# Patient Record
Sex: FEMALE | Race: WHITE | NOT HISPANIC OR LATINO | Employment: FULL TIME | URBAN - METROPOLITAN AREA
[De-identification: names, ages, dates, MRNs, and addresses within clinical notes are randomized per-mention and may not be internally consistent; named-entity substitution may affect disease eponyms.]

---

## 2017-01-05 ENCOUNTER — HOSPITAL ENCOUNTER (OUTPATIENT)
Dept: RADIOLOGY | Facility: HOSPITAL | Age: 27
Discharge: HOME | End: 2017-01-05
Attending: ORTHOPAEDIC SURGERY
Payer: OTHER GOVERNMENT

## 2017-01-05 ENCOUNTER — OFFICE VISIT (OUTPATIENT)
Dept: ORTHOPEDICS | Facility: CLINIC | Age: 27
End: 2017-01-05
Payer: OTHER GOVERNMENT

## 2017-01-05 VITALS
SYSTOLIC BLOOD PRESSURE: 103 MMHG | HEART RATE: 57 BPM | HEIGHT: 59 IN | DIASTOLIC BLOOD PRESSURE: 66 MMHG | BODY MASS INDEX: 27.4 KG/M2 | WEIGHT: 135.94 LBS

## 2017-01-05 DIAGNOSIS — M76.72 PERONEAL TENDINITIS, LEFT: Primary | ICD-10-CM

## 2017-01-05 DIAGNOSIS — M25.572 CHRONIC PAIN OF LEFT ANKLE: ICD-10-CM

## 2017-01-05 DIAGNOSIS — G89.29 CHRONIC PAIN OF LEFT ANKLE: ICD-10-CM

## 2017-01-05 PROCEDURE — 73610 X-RAY EXAM OF ANKLE: CPT | Mod: TC,LT

## 2017-01-05 PROCEDURE — 99213 OFFICE O/P EST LOW 20 MIN: CPT | Mod: PBBFAC | Performed by: PHYSICIAN ASSISTANT

## 2017-01-05 PROCEDURE — 73610 X-RAY EXAM OF ANKLE: CPT | Mod: 26,LT,, | Performed by: RADIOLOGY

## 2017-01-05 PROCEDURE — 99203 OFFICE O/P NEW LOW 30 MIN: CPT | Mod: S$PBB,,, | Performed by: PHYSICIAN ASSISTANT

## 2017-01-05 PROCEDURE — 99999 PR PBB SHADOW E&M-EST. PATIENT-LVL III: CPT | Mod: PBBFAC,,, | Performed by: PHYSICIAN ASSISTANT

## 2017-01-05 NOTE — MR AVS SNAPSHOT
[facility]  [address]  [facility] [address]  Phone: [phone]                  Leeann Hess   2017 9:00 AM   Appointment    Description:  Female : 1990   Provider:  Conchita Borrego PA-C   Department:  [facility] - Orthopedics                To Do List           Future Appointments        Provider Department Dept Phone    2017 9:00 AM Conchita Borrego PA-C [facility] [phone]      Goals (5 Years of Data)     None      [facility]     [facility] On Call Nurse Care Line -  Assistance  Registered nurses in the [facility] On Call Center provide clinical advisement, health education, appointment booking, and other advisory services.  Call for this free service at [phone].             Medications           Message regarding Medications     Verify the changes and/or additions to your medication regime listed below are the same as discussed with your clinician today.  If any of these changes or additions are incorrect, please notify your healthcare provider.             Verify that the below list of medications is an accurate representation of the medications you are currently taking.  If none reported, the list may be blank. If incorrect, please contact your healthcare provider. Carry this list with you in case of emergency.           Current Medications     sumatriptan (IMITREX) 100 MG tablet Take 1 tablet (100 mg total) by mouth every 2 (two) hours as needed for Migraine.           Clinical Reference Information           Allergies as of 2017     No Known Allergies      Immunizations Administered on Date of Encounter - 2017     None      [facility] Sign-Up     Activating your [facility] account is as easy as 1-2-3!     1) Visit [email or web address], select Sign Up Now, enter this activation code and your date of birth, then select Next.  -[email or web address]  Expires: 2017  7:48 AM      2) Create a username and password to use when you visit  [facility] in the future and select a security question in case you lose your password and select Next.    3) Enter your e-mail address and click Sign Up!    Additional Information  If you have questions, please e-mail [email or web address] or call [phone] to talk to our [facility] staff. Remember, [facility] is NOT to be used for urgent needs. For medical emergencies, dial 911.

## 2017-01-10 NOTE — PROGRESS NOTES
Subjective:      Patient ID: Leeann Hess is a 26 y.o. female.    Chief Complaint: No chief complaint on file.    HPI  26 year old female presents with chief complaint of left ankle pain since 11/5/16. [place] in the [employer] [place] 23 mile hike. She rolled it and fell on mile 4. She couldn't WB initially, but she was able to finish the hike. [place] were negative for fx. She was given crutches and did PT for 10 days. She says her ankle has gotten a lot better, but she still has some pain. It is at the lateral aspect. Her gait has changed. She walks and runs with her foot turned out. Sometimes her ankle gives way with stairs or if she steps wrong. It is painful when it gives way. She wears an ankle sleeve when she runs, but there is still soreness. Her knee is starting to hurt due to how she is walking/running. She had an ankle MRI last week that showed peroneal tenosynovitis and CFL tear.   Review of Systems   Constitution: Negative for chills, fever and night sweats.   Cardiovascular: Negative for chest pain.   Respiratory: Negative for cough and shortness of breath.    Hematologic/Lymphatic: Does not bruise/bleed easily.   Skin: Negative for color change.   Gastrointestinal: Negative for heartburn.   Genitourinary: Negative for dysuria.   Neurological: Negative for numbness and paresthesias.   Psychiatric/Behavioral: Negative for altered mental status.   Allergic/Immunologic: Negative for persistent infections.         Objective:            General    Vitals reviewed.  Constitutional: She is oriented to person, place, and time. She appears well-developed and well-nourished.   Cardiovascular: Normal rate.    Neurological: She is alert and oriented to person, place, and time.         Left Ankle/Foot Exam     Inspection  Erythema: absent    Tenderness   The patient is tender to palpation of the peroneals.    Range of Motion   The patient has normal left ankle  ROM.     Alignment   Hindfoot Alignment: neutral  Midfoot Alignment: normal  Forefoot Alignment: normal    Muscle Strength   The patient has normal left ankle strength.    Tests   Anterior drawer: negative  Varus tilt: negative    Other   Sensation: normal      Vascular Exam       Left Pulses  Dorsalis Pedis:      2+              X-ray: ordered and reviewed by myself. No fx or dislocation.         Assessment:       Encounter Diagnosis   Name Primary?    Peroneal tendinitis, left Yes          Plan:       Case was discussed with Dr. Bradshaw. He recommends PT. Order was placed. RTC if symptoms worsen or do not improve.

## 2017-01-18 ENCOUNTER — CLINICAL SUPPORT (OUTPATIENT)
Dept: REHABILITATION | Facility: HOSPITAL | Age: 27
End: 2017-01-18
Attending: ORTHOPAEDIC SURGERY
Payer: OTHER GOVERNMENT

## 2017-01-18 DIAGNOSIS — M76.72 PERONEAL TENDINITIS OF LEFT LOWER LEG: ICD-10-CM

## 2017-01-18 DIAGNOSIS — M25.572 LEFT ANKLE PAIN, UNSPECIFIED CHRONICITY: Primary | ICD-10-CM

## 2017-01-18 PROCEDURE — G8979 MOBILITY GOAL STATUS: HCPCS | Mod: CI | Performed by: PHYSICAL MEDICINE & REHABILITATION

## 2017-01-18 PROCEDURE — 97161 PT EVAL LOW COMPLEX 20 MIN: CPT | Performed by: PHYSICAL MEDICINE & REHABILITATION

## 2017-01-18 PROCEDURE — 97110 THERAPEUTIC EXERCISES: CPT | Performed by: PHYSICAL MEDICINE & REHABILITATION

## 2017-01-18 PROCEDURE — G8978 MOBILITY CURRENT STATUS: HCPCS | Mod: CJ | Performed by: PHYSICAL MEDICINE & REHABILITATION

## 2017-01-18 NOTE — PLAN OF CARE
Create Note         My Note 2:58 PM   Edit              Name:Leeann Hess  Physician:Augustine Bradshaw MD  Date of eval:1/18/2017  Orders: Physical Therapy evaluate and treat  Clinic: [id number]  Diagnosis:  1. Left ankle pain, unspecified chronicity      2. Peroneal tendinitis of left lower leg            Precautions: None  Evaluation date: 1/18/17  Visit # authorized: 1/12  Authorization period: 12/31/17  Plan of care expiration: 3/1/17        Subjective      Chief complaint: Patient states on a hike with [place] 11/4/16 and fell and rolled her ankle and had to continue rest of hike--19 miles while carrying 100#. Given crutches to use and used cruthches for 7 days and also went to physical therapy for 7 days. Currently, patient states when she inverts her left ankel feels soreness and strain lateral aspect left ankle just distal to lateral malleoli.  Onset of pain : 11/4/16  Mechanism of onset :  Fell and Rolled left ankle while hiking  PMH: N/A     Aggravating factors: inverting left ankle, running-has to turn her foot outward-or foot gives out on her  Easing factors: ice, going down stairs  Sleep is not disturbed. 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     Allergies: Review of patient's allergies indicates:  No Known Allergies     Pertinent Medical history/Comorbidities : None  Medication:          Current Outpatient Prescriptions on File Prior to Visit   Medication Sig Dispense Refill    sumatriptan (IMITREX) 100 MG tablet Take 1 tablet  "(100 mg total) by mouth every 2 (two) hours as needed for Migraine. 10 tablet 3      No current facility-administered medications on file prior to visit.          MRI: 2 weeks ago of left ankle: CFL ligament is torn    Xray: 1/4/17--no report in system     Pain level with 0 being the lowest and 10 being the highest presently: 0/10  Pain level with 0 being the lowest and 10 being the highest at worst: 3/10  Pain level with 0 being the lowest and 10 being the highest at best: 0/10     Patient Goals: "I want to be able to have full mobility and run"     Objective      Postural examination in standing and sitting:  -  (-) forward head  - (-) forward shoulders  - (-) hip high  -(-) shoulder high  - genu valgus  - increased pronation (-)         Functional assessment:   - walking/gait:Ambulates independent of AD with no limping on left LE. Tends to turn eft foot outward when walking  - sit to stand: Independent  - sit to supine: Independent   - supine to sit: Independent  - supine to prone: Independent      Girth Measurement Joint line Above malleoli Below   malleloi Figure 8   Left 20 cm 20 cm 25 cm 49 cm   Right 20 cm 20  cm 24 cm 48 cm         Ankle ROM: (measured in degrees)   Ankle RLE LLE   Dorsiflexion with knees straight 20 10           Plantarflexion 30 30   Inversion 40 20   Eversion 30 15   Great toe flexion 20 20   Great toe extension 20 20      Knee ROM: (measured in degrees)   Knee (R) (L)   Flexion 130 130   Extension 0 0      Flexibility testing:  - hamstrings: 90/90 test R -10 L -10   - gastrocnemius: DF ankle R 10 degrees L 10 degrees     Muscle Strength  MMT R L   Hip flexion 5/5 5/5   Hip abduction 5/5 5/5   Hip extension 5/5 5/5   Hip ER 5/5 5/5   Hip IR 5/5 5/5   Knee extension 5/5 5/5   Knee flexion 5/5 5/5   Ankle dorsiflexion 5/5 3/5   Ankle plantar flexion 5/5 3+/5   Ankle inversion 5/5 3+/5   Ankle eversion 5/5 3+/5      Endurance is Good     Palpation: Patient c/o soreness medial and inferior " "aspects lateral malleolus and Dorsum 5th metatarsal  Joint mobility: Hypomobility talus left ankle     Sensation: Intact        Outcome measures:   Impairment function: Mobility  FOTO score: 64/100  · G-code current: CJ at least 20%, but < 40% impaired, limited or restricted  · G-code goal: CI at least 1% but < 20% impaired, limited or restricted  · Severity modifier selections based on the FOTO scoring, objective findings, and co-morbidity assessment  · PT reviewed FOTO scores for Leeann Hess on 11817.   · FOTO scores [facility] - see media section.  ·       TREATMENT:  Therapeutic exercise: Leeann received therapeutic exercises to develop strength, stabilization and endurance; flexibility and range of motion for 30 minutes including:see HEP sheet.   Ankle ROM with Theraband: OTB  DF X 15  PF X 15  IV X 15  EV x15  Circles CW/CCW X 15  Seated Heel Raises X 15  Seated Toe Raises X 15  Standing Ankle DF self Mobilization against wall 10 x 5" hold     Kinesiotaping around left lateral mallelus for support and to left ankle to decrease swelling  Kinesiotaping  Patient was screened for use of kinesiotape and was cleared for use. Pt. received kinesiotaping on 1/18/17  1. Has the patient ever had a reaction to the adhesive in bandaids? Yes/No: No  2. Has the patient ever uses athletic/kinesiotape in the past? Yes/No: No  3. Is the patient hemodynamically impaired (PE, DVT, CHF, Kidney failure)? Yes/No: No  4. Can the PT/PTA apply the tape to your skin? Yes/No: Yes     Patient was instructed on duration to wear the tape, proper drying techniques for the tape, and to remove the tape if he/she has any skin irritation: including, but not exclusive to, redness/itchiness/discomfort. Patient verbalized understanding of these instructions.     Pt. Education: Instructed pt. regarding:proper technique with all exercises. Pt. to demonstrate good understanding of the education provided. Leeann demonstrated good return " demonstration of activities. No cultural, environmental, or spiritual barriers identified to treatment or learning.     Assessment   This is a 26 y.o. female referred to outpatient physical therapy and presents with a medical diagnosis of left ankle pain/peroneal tendinitis and PT diagnosis/findings of left ankle pain, decreased AROM and strength left ankle, antalgic gait, demonstrating limitations as described in the problem list. Patient was in agreement with set goals and plan of care. Pt was given a written HEP along with posture education, instruction on body mechanics, activity modification/avoidance, and left ankle AROM and strengthening regimen. Pt. verbally understood instructions and demonstrated proper form/technique. Pt was advised to perform these exercises free of pain, and discontinue use if symptoms persist/worsen. Pt will benefit from physical therapy services in order to maximize pain free functional independence. Rehab potential is good.     History  Co-morbidities and personal factors that may impact the plan of care Examination  Body Structures and Functions, activity limitations and participation restrictions that may impact the plan of care Clinical Presentation Decision Making/ Complexity Score   Co-morbidities:   None                    Personal Factors:   None Body Regions: left ankle     Body Systems: Musculoskeletal: decreased AROM and strength left ankle; Neuromuscular: antalgic gait              Activity limitationsParticipation Restrictions: Mobility; antalgic gait, unable to run; going down stairs                   Stable and uncomplicated       Low        FOTOScore: 64/100            Medical necessity is demonstrated by the following IMPAIRMENTS/PROBLEM LIST:  Decreased range of motion left ankle  Decreased strength left ankle  Increased pain with walking and running  Antalgic gait  IIncreased pain and limited with climbing stairs especially going down stairs  ADL and work activities  lead to increased pain and are limited  Functional impairment rating of: 30%     GOALS:   Short Term Goals: 3 weeks  Increase range of motion 25%  Increase strength 1/2 muscle grade  Be able to perform HEP with minimal cueing required  Improve functional impairment of mobility to 20%     Long Term Goals: 6 weeks  Increase range of motion to 75% to 100% full   Improve muscle strength 1 muscle grade  Improve muscle strength with MMT to 4+/5 to 5/5  Restore ability to ambulate with normal pain free gait  Walking for ADL and exercise will be restored without increased pain  Restore ability to climb stairs in a reciprocal manner with min to 0 increased pain and/or difficulty  Restore ability to perform ADL's and work activities independently and without increased pain  Improve functional impairment of mobility to 10%     Plan      Pt will be treated by physical therapy 1-3 times a week for 6 weeks to include: Therapeutic exercises to increase ROM, strength and stabilization; joint and soft tissue mobilization with manual therapy techniques to decrease muscle tightness, pain and improve joint mobility; neuromuscular re-education to improve balance, coordination, kinesthetic sense and proprioception, therapeutic activities to improve coordination, strength and function, therapeutic taping to decrease pain, provide support and improve function of the LE(s); modalities such as moist heat, ice, ultrasound and electrical stimulation to increase circulation, decrease pain and inflammation; dry needling with manual therapy techniques to decrease pain, inflammation and swelling, increase circulation and promote healing process will be considered and utilized as needed; aquatic physical therapy will be utilized as needed. Pt may be seen by PTA to carry out plan of care as part of the Rehab team.     I certify the need for these services furnished under this plan of treatment and while under my  care.     ____________________________________ Physician/Referring Practitioner                  Date of Vee Lomax PT

## 2017-01-18 NOTE — PROGRESS NOTES
Name:Leeann Hess  Physician:Augustine Bradshaw MD  Date of eval:1/18/2017  Orders:  Physical Therapy evaluate and treat  Clinic: [id number]  Diagnosis:  1. Left ankle pain, unspecified chronicity     2. Peroneal tendinitis of left lower leg         Precautions: None  Evaluation date: 1/18/17  Visit # authorized: 1/12  Authorization period: 12/31/17  Plan of care expiration: 3/1/17      Subjective     Chief complaint: Patient states on a hike with [place] 11/4/16 and fell and rolled her ankle and had to continue rest of hike--19 miles while carrying 100#. Given crutches to use and used cruthches for 7 days and also went to physical therapy for 7 days. Currently, patient states when she inverts her left ankel feels soreness and strain lateral aspect left ankle just distal to lateral malleoli.  Onset of pain : 11/4/16  Mechanism of onset :  Fell and Rolled left ankle while hiking  PMH: N/A    Aggravating factors: inverting left ankle, running-has to turn her foot outward-or foot gives out on her  Easing factors: ice, going down stairs  Sleep is not disturbed. 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    Allergies:  Review of patient's allergies indicates:  No Known Allergies    Pertinent Medical history/Comorbidities : None  Medication:   Current Outpatient Prescriptions on File Prior to Visit   Medication Sig Dispense Refill    sumatriptan (IMITREX) 100 MG tablet Take 1 tablet (100 mg total) by mouth every 2 (two) hours as needed for Migraine. 10 tablet 3  "    No current facility-administered medications on file prior to visit.        MRI: 2 weeks ago of left ankle: CFL ligament is torn            Xray: 1/4/17--no report in system    Pain level with 0 being the lowest and 10 being the highest presently: 0/10  Pain level with 0 being the lowest and 10 being the highest at worst: 3/10  Pain level with 0 being the lowest and 10 being the highest at best: 0/10     Patient Goals: "I want to be able to have full mobility and run"    Objective     Postural examination in standing and sitting:  -   (-) forward head  - (-) forward shoulders  - (-) hip high  -(-) shoulder high  - genu valgus  - increased pronation  (-)        Functional assessment:   - walking/gait:Ambulates independent of AD with no limping on left LE.  Tends to turn eft foot outward when walking  - sit to stand: Independent  - sit to supine: Independent       - supine to sit: Independent  - supine to prone: Independent     Girth Measurement Joint line Above malleoli Below   malleloi Figure 8   Left 20 cm 20 cm 25 cm 49 cm   Right 20 cm 20  cm 24 cm 48 cm       Ankle ROM: (measured in degrees)   Ankle RLE LLE   Dorsiflexion with knees straight 20 10        Plantarflexion 30 30   Inversion 40 20   Eversion 30 15   Great toe flexion 20 20   Great toe extension 20 20     Knee  ROM: (measured in degrees)   Knee (R) (L)   Flexion 130 130   Extension 0 0     Flexibility testing:  - hamstrings:     90/90 test R -10 L -10           - gastrocnemius:   DF ankle R 10 degrees L 10 degrees    Muscle Strength  MMT R L   Hip flexion 5/5 5/5   Hip abduction 5/5 5/5   Hip extension 5/5 5/5   Hip ER 5/5 5/5   Hip IR 5/5 5/5   Knee extension 5/5 5/5   Knee flexion 5/5 5/5   Ankle dorsiflexion 5/5 3/5   Ankle plantar flexion 5/5 3+/5   Ankle inversion 5/5 3+/5   Ankle eversion 5/5 3+/5     Endurance is Good    Palpation: Patient c/o soreness medial and inferior aspects lateral malleolus and  Dorsum 5th metatarsal  Joint mobility: " "Hypomobility talus left ankle    Sensation: Intact      Outcome measures:    Impairment function:  Mobility  FOTO score: 64/100  G-code current: CJ at least 20%, but < 40% impaired, limited or restricted  G-code goal: CI at least 1% but < 20% impaired, limited or restricted  Severity modifier selections based on the FOTO scoring, objective findings, and co-morbidity assessment  PT reviewed FOTO scores for Leeann Hess on [id number].   FOTO scores were entered into [facility] - see media section.      TREATMENT:  Therapeutic exercise: Leeann received therapeutic exercises to develop strength, stabilization and endurance; flexibility and range of motion for 30 minutes including:see HEP sheet.   Ankle ROM with Theraband: OTB  DF   X 15  PF  X 15  IV   X 15  EV x15  Circles  CW/CCW   X 15  Seated Heel Raises  X 15  Seated Toe Raises  X 15  Standing Ankle DF self Mobilization against wall    10 x 5" hold    Kinesiotaping around left lateral mallelus for support and to left ankle to decrease swelling  Kinesiotaping  Patient was screened for use of kinesiotape and was cleared for use. Pt. received kinesiotaping on 1/18/17  1. Has the patient ever had a reaction to the adhesive in bandaids? Yes/No: No  2. Has the patient ever uses athletic/kinesiotape in the past? Yes/No: No  3. Is the patient hemodynamically impaired (PE, DVT, CHF, Kidney failure)? Yes/No: No  4. Can the PT/PTA apply the tape to your skin? Yes/No: Yes    Patient was instructed on duration to wear the tape, proper drying techniques for the tape, and to remove the tape if he/she has any skin irritation: including, but not exclusive to, redness/itchiness/discomfort. Patient verbalized understanding of these instructions.    Pt. Education: Instructed pt. regarding:proper technique with all exercises. Pt. to demonstrate good understanding of the education provided. Leeann demonstrated good return demonstration of activities. No cultural, environmental, or spiritual barriers " identified to treatment or learning.    Assessment   This is a 26 y.o. female referred to outpatient physical therapy and presents with a medical diagnosis of left ankle pain/peroneal tendinitis and PT diagnosis/findings of left ankle pain, decreased AROM and strength left ankle, antalgic gait, demonstrating limitations as described in the problem list. Patient was in agreement with set goals and plan of care. Pt was given a written HEP along with posture education, instruction on body mechanics, activity modification/avoidance, and left ankle AROM and strengthening regimen. Pt. verbally understood instructions and demonstrated proper form/technique. Pt was advised to perform these exercises free of pain, and discontinue use if symptoms persist/worsen. Pt will benefit from physical therapy services in order to maximize pain free functional independence. Rehab potential is good.    History  Co-morbidities and personal factors that may impact the plan of care Examination  Body Structures and Functions, activity limitations and participation restrictions that may impact the plan of care Clinical Presentation   Decision Making/ Complexity Score   Co-morbidities:   None              Personal Factors:   None Body Regions: left ankle    Body Systems: Musculoskeletal: decreased AROM and strength left ankle; Neuromuscular: antalgic gait          Activity limitationsParticipation Restrictions: Mobility; antalgic gait, unable to run; going down stairs               Stable and uncomplicated     Low      FOTOScore: 64/100         Medical necessity is demonstrated by the following IMPAIRMENTS/PROBLEM LIST:  Decreased range of motion left ankle  Decreased strength left ankle  Increased pain with walking and running  Antalgic gait  IIncreased pain and limited with climbing stairs especially going down stairs  ADL and work activities lead to increased pain and are limited  Functional impairment rating of: 30%    GOALS:   Short Term  Goals:  3 weeks  Increase range of motion 25%  Increase strength 1/2 muscle grade  Be able to perform HEP with minimal cueing required  Improve functional impairment of mobility to 20%    Long Term Goals: 6 weeks  Increase range of motion to 75% to 100% full   Improve muscle strength 1 muscle grade  Improve muscle strength with MMT to 4+/5 to 5/5  Restore ability to ambulate with normal pain free gait  Walking for ADL and exercise will be restored without increased pain  Restore ability to climb stairs in a reciprocal manner with min to 0 increased pain and/or difficulty  Restore ability to perform ADL's and work activities independently and without increased pain  Improve functional impairment of mobility to 10%    Plan     Pt will be treated by physical therapy 1-3 times a week for 6 weeks to include: Therapeutic exercises to increase ROM, strength and stabilization; joint and soft tissue mobilization with manual therapy techniques to decrease muscle tightness, pain and improve joint mobility; neuromuscular re-education to improve balance, coordination, kinesthetic sense and proprioception, therapeutic activities to improve coordination, strength and function, therapeutic taping to decrease pain, provide support and improve function of the LE(s); modalities such as moist heat, ice, ultrasound and electrical stimulation to increase circulation, decrease pain and inflammation; dry needling with manual therapy techniques to decrease pain, inflammation and swelling, increase circulation and promote healing process will be considered and utilized as needed; aquatic physical therapy will be utilized as needed.  Pt may be seen by PTA to carry out plan of care as part of the Rehab team.    I certify the need for these services furnished under this plan of treatment and while under my care.    ____________________________________ Physician/Referring Practitioner                                  Date of  Signature            Sharonda Lomax, PT

## 2017-01-27 ENCOUNTER — CLINICAL SUPPORT (OUTPATIENT)
Dept: REHABILITATION | Facility: HOSPITAL | Age: 27
End: 2017-01-27
Attending: ORTHOPAEDIC SURGERY
Payer: OTHER GOVERNMENT

## 2017-01-27 DIAGNOSIS — M25.572 LEFT ANKLE PAIN, UNSPECIFIED CHRONICITY: Primary | ICD-10-CM

## 2017-01-27 DIAGNOSIS — M76.72 PERONEAL TENDINITIS OF LEFT LOWER LEG: ICD-10-CM

## 2017-01-27 PROCEDURE — 97140 MANUAL THERAPY 1/> REGIONS: CPT | Performed by: PHYSICAL MEDICINE & REHABILITATION

## 2017-01-27 PROCEDURE — 97110 THERAPEUTIC EXERCISES: CPT | Performed by: PHYSICAL MEDICINE & REHABILITATION

## 2017-01-27 NOTE — PROGRESS NOTES
"Name: Leeann Hess  [facility] Number: [id number]  Diagnosis:   Encounter Diagnoses   Name Primary?    Left ankle pain, unspecified chronicity Yes    Peroneal tendinitis of left lower leg      Physician: Augustine Bradshaw MD  Treatment Orders: PT Eval and Treat  Past Medical History   Diagnosis Date    Chronic sinus complaints     Migraine headache     Seasonal allergies        Precautions: None   Visit # authorized: 2/12 on 1/27/2017  Authorization period: 12/31/17  Plan of care expiration: 3/1/17      Subjective     Pt reports: No pain today just has pain with stepping wrong lateral aspect left ankle.    Pain Scale: before treatment: 0/10 currently; after treatment: 1/10    Objective         TREATMENT  Therapeutic exercise: Leeann received therapeutic exercises to develop strength for 35 minutes including:    Ankle ROM with Theraband: OTB  DF      2 X 15  PF      2 X 15  IV        2 X 15  EV     2 x 15  Seated Heel Raises    2  X 15  Seated Toe Raises     2 X 15  Standing Ankle DF self Mobilization against wall 10 x 5" hold   Shuttle SL 1 cord  X 15  Shuttle Heel Raises  1 cord x 15  SL Stand on blue foam   5 x 10"  Forward Lunges  X 10  Lateral Lunges   X 10  SL Lateral Squats on 6" step   X 10    Manual therapy: Leeann  received the following manual therapy techniques x 10 min. to include soft tissue and joint mobilization were applied to the: left ankle to include: Talo-crural Grade II mobs; medial/lateral calcaneal glides; prone DF stretch left ankle   Kinesiotaping around left  lateral malleolus for support    Written Home Exercises Provided: Single leg stand, lunges and lateral squats on step  Pt demo good understanding of the education provided. Leeann demonstrated good return demonstration of activities.     Pt. education:  · Posture reeducation, body mechanics, HEP,   · No spiritual or educational barriers to learning provided  · Pt has no cultural, educational or language barriers to learning " provided.    Assessment     Patient performed above exercise program with verbal cueing for proper technique and tolerated addition of stabilization exercises to left ankle to increase strength. Patient instructed to continue with HEP.  Pt will continue to benefit from skilled outpatient physical therapy to address the remaining functional deficits, provide pt/family education, and to maximize pt's level of independence in the home and community environment. .     Goals:   Short Term Goals: 3 weeks  Increase range of motion 25%  Increase strength 1/2 muscle grade  Be able to perform HEP with minimal cueing required  Improve functional impairment of mobility to 20%      Long Term Goals: 6 weeks  Increase range of motion to 75% to 100% full   Improve muscle strength 1 muscle grade  Improve muscle strength with MMT to 4+/5 to 5/5  Restore ability to ambulate with normal pain free gait  Walking for ADL and exercise will be restored without increased pain  Restore ability to climb stairs in a reciprocal manner with min to 0 increased pain and/or difficulty  Restore ability to perform ADL's and work activities independently and without increased pain  Improve functional impairment of mobility to 10%    Anticipated barriers to physical therapy: None  Pt's spiritual, cultural and educational needs considered and pt agreeable to plan of care and goals        Plan   Continue with established Plan of Care towards PT goals.              Sharonda Lomax, PT

## 2017-02-27 ENCOUNTER — DOCUMENTATION ONLY (OUTPATIENT)
Dept: REHABILITATION | Facility: HOSPITAL | Age: 27
End: 2017-02-27

## 2017-02-27 DIAGNOSIS — M25.572 LEFT ANKLE PAIN, UNSPECIFIED CHRONICITY: Primary | ICD-10-CM

## 2017-02-27 DIAGNOSIS — M76.72 PERONEAL TENDINITIS OF LEFT LOWER LEG: ICD-10-CM

## 2017-02-27 NOTE — PROGRESS NOTES
Ms. Hess was seen in outpatient physical therapy for an initial evaluation on 1/18/17 for left peroneal tendinitis.   attended one follow up visit and then self discharged as she cancelled/no showed fro her remaining scheduled visits. POC has ended and patient is discharged form physical therapy.            Sharonda Lomax, PT

## 2020-03-26 ENCOUNTER — HOSPITAL ENCOUNTER (EMERGENCY)
Facility: HOSPITAL | Age: 30
Discharge: HOME | End: 2020-03-26
Attending: EMERGENCY MEDICINE

## 2020-03-26 VITALS
DIASTOLIC BLOOD PRESSURE: 96 MMHG | WEIGHT: 128 LBS | SYSTOLIC BLOOD PRESSURE: 125 MMHG | BODY MASS INDEX: 25.8 KG/M2 | HEART RATE: 90 BPM | RESPIRATION RATE: 18 BRPM | HEIGHT: 59 IN | TEMPERATURE: 100 F | OXYGEN SATURATION: 99 %

## 2020-03-26 DIAGNOSIS — R05.9 COUGH: ICD-10-CM

## 2020-03-26 DIAGNOSIS — Z20.822 SUSPECTED COVID-19 VIRUS INFECTION: Primary | ICD-10-CM

## 2020-03-26 LAB
CTP QC/QA: YES
POC MOLECULAR INFLUENZA A AGN: NEGATIVE
POC MOLECULAR INFLUENZA B AGN: NEGATIVE

## 2020-03-26 PROCEDURE — 99284 PR EMERGENCY DEPT VISIT,LEVEL IV: ICD-10-PCS | Mod: ,,, | Performed by: EMERGENCY MEDICINE

## 2020-03-26 PROCEDURE — 25000003 PHARM REV CODE 250: Performed by: EMERGENCY MEDICINE

## 2020-03-26 PROCEDURE — 99283 EMERGENCY DEPT VISIT LOW MDM: CPT | Mod: 25

## 2020-03-26 PROCEDURE — 99284 EMERGENCY DEPT VISIT MOD MDM: CPT | Mod: ,,, | Performed by: EMERGENCY MEDICINE

## 2020-03-26 PROCEDURE — 87502 INFLUENZA DNA AMP PROBE: CPT

## 2020-03-26 RX ORDER — AZITHROMYCIN 250 MG/1
250 TABLET, FILM COATED ORAL DAILY
Qty: 6 TABLET | Refills: 0 | Status: SHIPPED | OUTPATIENT
Start: 2020-03-26

## 2020-03-26 RX ORDER — GUAIFENESIN/DEXTROMETHORPHAN 100-10MG/5
10 SYRUP ORAL
Status: COMPLETED | OUTPATIENT
Start: 2020-03-26 | End: 2020-03-26

## 2020-03-26 RX ORDER — GUAIFENESIN/DEXTROMETHORPHAN 100-10MG/5
5 SYRUP ORAL 4 TIMES DAILY PRN
Qty: 120 ML | Refills: 0 | Status: SHIPPED | OUTPATIENT
Start: 2020-03-26 | End: 2020-04-05

## 2020-03-26 RX ADMIN — GUAIFENESIN AND DEXTROMETHORPHAN 10 ML: 100; 10 SYRUP ORAL at 06:03

## 2020-03-26 NOTE — ED PROVIDER NOTES
Encounter Date: 3/26/2020       History     Chief Complaint   Patient presents with    Cough     cough and fever     29 yo W with pmhx allergies presents with a chief complaint of URI symptoms.  Patient reports 2 days of a cough.  It is associated with a fever.  Cough is worsened deep inspiration.  Some shortness of breath with deep inspiration as well.  Patient has been able to perform ADLs but was unable to run today secondary to the cough.  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  No history of similar symptoms.  No history of VTE.        Review of patient's allergies indicates:  No Known Allergies  Past Medical History:   Diagnosis Date    Chronic sinus complaints     Migraine headache     Seasonal allergies      No past surgical history on file.  Family History   Problem Relation Age of Onset    Breast cancer Maternal Grandmother     Colon cancer Neg Hx     Ovarian cancer Neg Hx      Social History     Tobacco Use    Smoking status: Never Smoker   Substance Use Topics    Alcohol use: No    Drug use: Not on file     Review of Systems   Constitutional: Positive for fever.   HENT: Negative for congestion.    Eyes: Negative for discharge.   Respiratory: Positive for cough and shortness of breath.    Cardiovascular: Negative for chest pain.   Gastrointestinal: Negative for abdominal pain.   Endocrine: Negative for polyuria.   Genitourinary: Negative for dysuria.   Musculoskeletal: Negative for back pain.   Skin: Negative for wound.   Allergic/Immunologic: Negative for immunocompromised state.   Neurological: Negative for headaches.   Hematological: Does not bruise/bleed easily.   Psychiatric/Behavioral: Negative for confusion.       Physical Exam     Initial Vitals   BP Pulse Resp Temp SpO2   03/26/20 1719 03/26/20 1710 03/26/20 1710 03/26/20 1710 03/26/20 1710   (!) 125/96 93 18 100.1 °F (37.8 °C) 98 %      MAP       --                 Physical Exam    Nursing note and vitals reviewed.  Constitutional: She appears well-developed and well-nourished. She is not diaphoretic. No distress.   HENT:   Head: Normocephalic and atraumatic.   Eyes: EOM are normal. Right eye exhibits no discharge. Left eye exhibits no discharge. No scleral icterus.   Neck: Normal range of motion. Neck supple. No JVD present.   Cardiovascular: Normal rate, regular rhythm, normal heart sounds and intact distal pulses. Exam reveals no gallop and no friction rub.    No murmur heard.  Pulmonary/Chest: No respiratory distress. She has no wheezes. She has rhonchi (Bibasilar). She exhibits no tenderness.   Frequent cough but no significant respiratory distress   Musculoskeletal: Normal range of motion. She exhibits no edema or tenderness.   Neurological: She is alert and oriented to person, place, and time. No sensory deficit.   Skin: Skin is warm and dry. Capillary refill takes less than 2 seconds.   Psychiatric: She has a normal mood and affect.         ED Course   Procedures  Labs Reviewed   POCT INFLUENZA A/B MOLECULAR - Normal          Imaging Results          X-Ray Chest AP Portable (Final result)  Result time 03/26/20 18:44:00    Final result by Efrem Cain MD (03/26/20 18:44:00)                 Impression:      No acute abnormality.      Electronically signed by: Efrem Cain  Date:    03/26/2020  Time:    18:44             Narrative:    EXAMINATION:  XR CHEST AP PORTABLE    CLINICAL HISTORY:  Cough    TECHNIQUE:  Single frontal view of the chest was performed.    COMPARISON:  None    FINDINGS:  The lungs are clear, with normal appearance of pulmonary vasculature and no pleural effusion or pneumothorax.    The cardiac silhouette is normal in size. The hilar and mediastinal contours are unremarkable.    Bones are intact.                                 Medical Decision Making:   History:   I obtained history from: someone other than patient.  Old Medical Records: I  decided to obtain old medical records.  Initial Assessment:   31 yo W with pmhx allergies presents with a chief complaint of URI symptoms.    Differential Diagnosis:   Influenza, COVID-19, pneumonia  Clinical Tests:   Radiological Study: Ordered  ED Management:  I doubt PE and the patient is PERC negative.  Will administer cough suppressant and obtain chest x-ray.    Reassessment:  Chest x-ray within normal limits.  On repeat assessment, patient is well-appearing.  Patient's symptoms are overall consistent with COVID-19.  Despite a normal chest x-ray, patient's lungs sound rhonchorous.  Will start her on empiric course of azithromycin.  Will also prescribe her a cough suppressant.  Home COVID monitoring and self isolation instructions.  Extensive return precautions.                                 Clinical Impression:       ICD-10-CM ICD-9-CM   1. Suspected Covid-19 Virus Infection R68.89    2. Cough R05 786.2         Disposition:   Disposition: Discharged     ED Disposition Condition    Discharge Stable        ED Prescriptions     Medication Sig Dispense Start Date End Date Auth. Provider    azithromycin (Z-YUAN) 250 MG tablet Take 1 tablet (250 mg total) by mouth once daily. Take first 2 tablets together, then 1 every day until finished. 6 tablet 3/26/2020  Hong Mendoza MD    dextromethorphan-guaifenesin  mg/5 ml (ROBITUSSIN-DM)  mg/5 mL liquid Take 5 mLs by mouth 4 (four) times daily as needed (cough). 120 mL 3/26/2020 4/5/2020 Hong Mendoza MD        Follow-up Information     Follow up With Specialties Details Why Contact Info    Dorina Ivy MD General Practice  As needed [address] [address]  [phone]                                       Hong Mendoza MD  03/26/20 [id number]

## 2020-03-26 NOTE — ED NOTES
Leeann Hess, a 30 y.o. female presents to the ED via personal transporatio with CC increased cough over the last 2 days.  Complains of temp of 101 at home, SOB, and cough.       Patient identifiers verified verbally with patient and correct for Leeann Hess.

## 2020-03-26 NOTE — DISCHARGE INSTRUCTIONS
[place] [facility]  Preventing the Spread of Coronavirus Disease 2019 in Homes and Residential Communities      Prevention steps for people with confirmed or suspected COVID-19 (including persons under investigation) who do not need to be hospitalized and people with confirmed COVID-19 who were hospitalized and determined to be medically stable to go home.    Your healthcare provider and public health staff will evaluate whether you can be cared for at home.  Stay home except to get medical care.  Separate yourself from other people and animals in your home  Call ahead before visiting your doctor.  Wear a facemask.  Cover your coughs and sneezes.  Clean your hands often.  Avoid sharing personal household items.  Clean all high-touch surfaces every day.  Monitor your symptoms. Seek prompt medical attention if your illness is worsening (e.g., difficulty breathing). Before seeking care, call your healthcare provider.  If you have a medical emergency and need to call 911, notify the dispatch personnel that you have, or are being evaluated for COVID-19. If possible, put on a facemask before emergency medical services arrive.  Discontinuing home isolation. Call your provider about guidance to discontinue home isolation.    Recommended precautions for household members, intimate partners, and caregivers in a nonhealthcare setting of a patient with symptomatic laboratory-confirmed COVID-19 or a patient under investigation.  Household members, intimate partners, and caregivers in a nonhealthcare setting may have close contact with a person with symptomatic, laboratory-confirmed COVID-19 or a person under investigation. Close contacts should monitor their health; they should call their healthcare provider right away if they develop symptoms suggestive of COVID-19 (e.g., fever, cough, shortness of breath).    Close contacts should also follow these recommendations:  Make sure that you understand and  can help the patient follow their healthcare provider's instructions for medication(s) and care. You should help the patient with basic needs in the home and provide support for getting groceries, prescriptions, and other personal needs.  Monitor the patient's symptoms. If the patient is getting sicker, call his or her healthcare provider and tell them that the patient has laboratory-confirmed COVID-19. This will help the healthcare provider's office take steps to keep other people in the office or waiting room from getting infected. Ask the healthcare provider to call the local or [facility] health department for additional guidance. If the patient has a medical emergency and you need to call 911, notify the dispatch personnel that the patient has, or is being evaluated for COVID-19.  Household members should stay in another room or be  from the patient as much as possible. Household members should use a separate bedroom and bathroom, if available.  Prohibit visitors who do not have an essential need to be in the home.  Household members should care for any pets in the home. Do not handle pets or other animals while sick.  Make sure that shared spaces in the home have good air flow, such as by an air conditioner or an opened window, weather permitting.  Perform hand hygiene frequently. Wash your hands often with soap and water for at least 20 seconds or use an alcohol-based hand  that contains 60 to 95% alcohol, covering all surfaces of your hands and rubbing them together until they feel dry. Soap and water should be used preferentially if hands are visibly dirty.  Avoid touching your eyes, nose, and mouth with unwashed hands.  The patient should wear a facemask when you are around other people. If the patient is not able to wear a facemask (for example, because it causes trouble breathing), you, as the caregiver should wear a mask when you are in the same room as the patient.  Wear a disposable  facemask and gloves when you touch or have contact with the patient's blood, stool, or body fluids, such as saliva, sputum, nasal mucus, vomit, urine.  Throw out disposable facemasks and gloves after using them. Do not reuse.  When removing personal protective equipment, first remove and dispose of gloves. Then, immediately clean your hands with soap and water or alcohol-based hand . Next, remove and dispose of facemask, and immediately clean your hands again with soap and water or alcohol-based hand .  Avoid sharing household items with the patient. You should not share dishes, drinking glasses, cups, eating utensils, towels, bedding, or other items. After the patient uses these items, you should wash them thoroughly (see below Wash laundry thoroughly).  Clean all high-touch surfaces, such as counters, tabletops, doorknobs, bathroom fixtures, toilets, phones, keyboards, tablets, and bedside tables, every day. Also, clean any surfaces that may have blood, stool, or body fluids on them.  Use a household cleaning spray or wipe, according to the label instructions. Labels contain instructions for safe and effective use of the cleaning product including precautions you should take when applying the product, such as wearing gloves and making sure you have good ventilation during use of the product.  Wash laundry thoroughly.  Immediately remove and wash clothes or bedding that have blood, stool, or body fluids on them.  Wear disposable gloves while handling soiled items and keep soiled items away from your body. Clean your hands (with soap and water or an alcohol-based hand ) immediately after removing your gloves.  Read and follow directions on labels of laundry or clothing items and detergent. In general, using a normal laundry detergent according to washing machine instructions and dry thoroughly using the warmest temperatures recommended on the clothing label.  Place all used disposable  gloves, facemasks, and other contaminated items in a lined container before disposing of them with other household waste. Clean your hands (with soap and water or an alcohol-based hand ) immediately after handling these items. Soap and water should be used preferentially if hands are visibly dirty.  Discuss any additional questions with your state or local health department or healthcare provider. Check available hours when contacting your local health department.    For more information see CDC link below.      https://www.cdc.gov/coronavirus/2019-ncov/hcp/guidance-prevent-spread.html#precautions

## 2020-03-26 NOTE — ED TRIAGE NOTES
Patient complains of cough over the last 2 days, complains of temp of 101 at home.  States she does feel slightly SOB.

## 2021-04-16 ENCOUNTER — PATIENT MESSAGE (OUTPATIENT)
Dept: RESEARCH | Facility: HOSPITAL | Age: 31
End: 2021-04-16

## 2021-07-01 ENCOUNTER — PATIENT MESSAGE (OUTPATIENT)
Dept: ADMINISTRATIVE | Facility: OTHER | Age: 31
End: 2021-07-01

## 2022-02-14 ENCOUNTER — PATIENT MESSAGE (OUTPATIENT)
Dept: RESEARCH | Facility: HOSPITAL | Age: 32
End: 2022-02-14